# Patient Record
Sex: FEMALE | Race: WHITE | NOT HISPANIC OR LATINO | URBAN - METROPOLITAN AREA
[De-identification: names, ages, dates, MRNs, and addresses within clinical notes are randomized per-mention and may not be internally consistent; named-entity substitution may affect disease eponyms.]

---

## 2018-01-12 ENCOUNTER — ON CAMPUS - OUTPATIENT (AMBULATORY)
Dept: URBAN - METROPOLITAN AREA HOSPITAL 2 | Facility: HOSPITAL | Age: 83
End: 2018-01-12

## 2018-01-12 VITALS
OXYGEN SATURATION: 97 % | HEART RATE: 66 BPM | HEIGHT: 64 IN | SYSTOLIC BLOOD PRESSURE: 135 MMHG | DIASTOLIC BLOOD PRESSURE: 78 MMHG | SYSTOLIC BLOOD PRESSURE: 162 MMHG | DIASTOLIC BLOOD PRESSURE: 85 MMHG | RESPIRATION RATE: 18 BRPM | HEART RATE: 72 BPM | HEART RATE: 63 BPM | DIASTOLIC BLOOD PRESSURE: 58 MMHG | DIASTOLIC BLOOD PRESSURE: 72 MMHG | DIASTOLIC BLOOD PRESSURE: 77 MMHG | TEMPERATURE: 97.6 F | HEART RATE: 67 BPM | SYSTOLIC BLOOD PRESSURE: 179 MMHG | HEART RATE: 68 BPM | WEIGHT: 140 LBS | HEART RATE: 71 BPM | RESPIRATION RATE: 15 BRPM | RESPIRATION RATE: 16 BRPM | HEART RATE: 61 BPM | SYSTOLIC BLOOD PRESSURE: 169 MMHG | SYSTOLIC BLOOD PRESSURE: 153 MMHG | DIASTOLIC BLOOD PRESSURE: 81 MMHG | SYSTOLIC BLOOD PRESSURE: 148 MMHG | SYSTOLIC BLOOD PRESSURE: 161 MMHG | OXYGEN SATURATION: 96 % | OXYGEN SATURATION: 100 % | DIASTOLIC BLOOD PRESSURE: 66 MMHG | DIASTOLIC BLOOD PRESSURE: 65 MMHG

## 2018-01-12 DIAGNOSIS — K57.30 DIVERTICULOSIS OF LARGE INTESTINE WITHOUT PERFORATION OR ABS: ICD-10-CM

## 2018-01-12 DIAGNOSIS — R19.5 OTHER FECAL ABNORMALITIES: ICD-10-CM

## 2018-01-12 DIAGNOSIS — K64.1 SECOND DEGREE HEMORRHOIDS: ICD-10-CM

## 2018-01-12 PROCEDURE — 45378 DIAGNOSTIC COLONOSCOPY: CPT | Performed by: INTERNAL MEDICINE

## 2018-01-12 RX ADMIN — PROPOFOL: 10 INJECTION, EMULSION INTRAVENOUS at 13:38

## 2023-09-07 ENCOUNTER — HOSPITAL ENCOUNTER (OUTPATIENT)
Facility: HOSPITAL | Age: 88
Setting detail: OBSERVATION
Discharge: HOME | End: 2023-09-08
Attending: INTERNAL MEDICINE
Payer: MEDICARE

## 2023-09-07 ENCOUNTER — APPOINTMENT (OUTPATIENT)
Dept: GENERAL RADIOLOGY | Facility: HOSPITAL | Age: 88
End: 2023-09-07
Payer: MEDICARE

## 2023-09-07 ENCOUNTER — APPOINTMENT (OUTPATIENT)
Dept: CT IMAGING | Facility: HOSPITAL | Age: 88
End: 2023-09-07
Payer: MEDICARE

## 2023-09-07 ENCOUNTER — APPOINTMENT (OUTPATIENT)
Dept: MRI IMAGING | Facility: HOSPITAL | Age: 88
End: 2023-09-07
Payer: MEDICARE

## 2023-09-07 DIAGNOSIS — I48.91 ATRIAL FIBRILLATION WITH RVR: Primary | ICD-10-CM

## 2023-09-07 DIAGNOSIS — U07.1 COVID-19 VIRUS DETECTED: ICD-10-CM

## 2023-09-07 DIAGNOSIS — R20.0 NUMBNESS: ICD-10-CM

## 2023-09-07 DIAGNOSIS — R55 NEAR SYNCOPE: ICD-10-CM

## 2023-09-07 LAB
ALBUMIN SERPL-MCNC: 3.6 G/DL (ref 3.5–5.2)
ALBUMIN/GLOB SERPL: 1.2 G/DL
ALP SERPL-CCNC: 68 U/L (ref 39–117)
ALT SERPL W P-5'-P-CCNC: 11 U/L (ref 1–33)
ANION GAP SERPL CALCULATED.3IONS-SCNC: 11 MMOL/L (ref 5–15)
AST SERPL-CCNC: 20 U/L (ref 1–32)
B PARAPERT DNA SPEC QL NAA+PROBE: NOT DETECTED
B PERT DNA SPEC QL NAA+PROBE: NOT DETECTED
BACTERIA UR QL AUTO: ABNORMAL /HPF
BASOPHILS # BLD AUTO: 0 10*3/MM3 (ref 0–0.2)
BASOPHILS NFR BLD AUTO: 0.3 % (ref 0–1.5)
BILIRUB SERPL-MCNC: 0.6 MG/DL (ref 0–1.2)
BILIRUB UR QL STRIP: NEGATIVE
BUN SERPL-MCNC: 19 MG/DL (ref 8–23)
BUN/CREAT SERPL: 24.1 (ref 7–25)
C PNEUM DNA NPH QL NAA+NON-PROBE: NOT DETECTED
CALCIUM SPEC-SCNC: 9.9 MG/DL (ref 8.6–10.5)
CHLORIDE SERPL-SCNC: 96 MMOL/L (ref 98–107)
CLARITY UR: CLEAR
CO2 SERPL-SCNC: 26 MMOL/L (ref 22–29)
COLOR UR: YELLOW
CREAT SERPL-MCNC: 0.79 MG/DL (ref 0.57–1)
DEPRECATED RDW RBC AUTO: 45.1 FL (ref 37–54)
EGFRCR SERPLBLD CKD-EPI 2021: 72.1 ML/MIN/1.73
EOSINOPHIL # BLD AUTO: 0 10*3/MM3 (ref 0–0.4)
EOSINOPHIL NFR BLD AUTO: 0.2 % (ref 0.3–6.2)
ERYTHROCYTE [DISTWIDTH] IN BLOOD BY AUTOMATED COUNT: 14 % (ref 12.3–15.4)
FLUAV SUBTYP SPEC NAA+PROBE: NOT DETECTED
FLUBV RNA ISLT QL NAA+PROBE: NOT DETECTED
GEN 5 2HR TROPONIN T REFLEX: 23 NG/L
GLOBULIN UR ELPH-MCNC: 2.9 GM/DL
GLUCOSE SERPL-MCNC: 99 MG/DL (ref 65–99)
GLUCOSE UR STRIP-MCNC: NEGATIVE MG/DL
HADV DNA SPEC NAA+PROBE: NOT DETECTED
HCOV 229E RNA SPEC QL NAA+PROBE: NOT DETECTED
HCOV HKU1 RNA SPEC QL NAA+PROBE: NOT DETECTED
HCOV NL63 RNA SPEC QL NAA+PROBE: NOT DETECTED
HCOV OC43 RNA SPEC QL NAA+PROBE: NOT DETECTED
HCT VFR BLD AUTO: 36.5 % (ref 34–46.6)
HGB BLD-MCNC: 11.9 G/DL (ref 12–15.9)
HGB UR QL STRIP.AUTO: ABNORMAL
HMPV RNA NPH QL NAA+NON-PROBE: NOT DETECTED
HPIV1 RNA ISLT QL NAA+PROBE: NOT DETECTED
HPIV2 RNA SPEC QL NAA+PROBE: NOT DETECTED
HPIV3 RNA NPH QL NAA+PROBE: NOT DETECTED
HPIV4 P GENE NPH QL NAA+PROBE: NOT DETECTED
HYALINE CASTS UR QL AUTO: ABNORMAL /LPF
KETONES UR QL STRIP: ABNORMAL
LEUKOCYTE ESTERASE UR QL STRIP.AUTO: ABNORMAL
LYMPHOCYTES # BLD AUTO: 1 10*3/MM3 (ref 0.7–3.1)
LYMPHOCYTES NFR BLD AUTO: 16.9 % (ref 19.6–45.3)
M PNEUMO IGG SER IA-ACNC: NOT DETECTED
MAGNESIUM SERPL-MCNC: 2.1 MG/DL (ref 1.6–2.4)
MCH RBC QN AUTO: 28.7 PG (ref 26.6–33)
MCHC RBC AUTO-ENTMCNC: 32.7 G/DL (ref 31.5–35.7)
MCV RBC AUTO: 87.6 FL (ref 79–97)
MONOCYTES # BLD AUTO: 0.8 10*3/MM3 (ref 0.1–0.9)
MONOCYTES NFR BLD AUTO: 13.7 % (ref 5–12)
NEUTROPHILS NFR BLD AUTO: 4.1 10*3/MM3 (ref 1.7–7)
NEUTROPHILS NFR BLD AUTO: 68.9 % (ref 42.7–76)
NITRITE UR QL STRIP: NEGATIVE
NRBC BLD AUTO-RTO: 0.1 /100 WBC (ref 0–0.2)
PH UR STRIP.AUTO: 6 [PH] (ref 5–8)
PLATELET # BLD AUTO: 124 10*3/MM3 (ref 140–450)
PMV BLD AUTO: 8.8 FL (ref 6–12)
POTASSIUM SERPL-SCNC: 4 MMOL/L (ref 3.5–5.2)
PROT SERPL-MCNC: 6.5 G/DL (ref 6–8.5)
PROT UR QL STRIP: NEGATIVE
QT INTERVAL: 332 MS
QTC INTERVAL: 367 MS
RBC # BLD AUTO: 4.16 10*6/MM3 (ref 3.77–5.28)
RBC # UR STRIP: ABNORMAL /HPF
REF LAB TEST METHOD: ABNORMAL
RHINOVIRUS RNA SPEC NAA+PROBE: NOT DETECTED
RSV RNA NPH QL NAA+NON-PROBE: NOT DETECTED
SARS-COV-2 RNA NPH QL NAA+NON-PROBE: DETECTED
SODIUM SERPL-SCNC: 133 MMOL/L (ref 136–145)
SP GR UR STRIP: 1.01 (ref 1–1.03)
SQUAMOUS #/AREA URNS HPF: ABNORMAL /HPF
TROPONIN T DELTA: 0 NG/L
TROPONIN T SERPL HS-MCNC: 23 NG/L
TSH SERPL DL<=0.05 MIU/L-ACNC: 3.87 UIU/ML (ref 0.27–4.2)
UROBILINOGEN UR QL STRIP: ABNORMAL
WBC # UR STRIP: ABNORMAL /HPF
WBC NRBC COR # BLD: 6 10*3/MM3 (ref 3.4–10.8)

## 2023-09-07 PROCEDURE — 71045 X-RAY EXAM CHEST 1 VIEW: CPT

## 2023-09-07 PROCEDURE — 25010000002 ONDANSETRON PER 1 MG: Performed by: NURSE PRACTITIONER

## 2023-09-07 PROCEDURE — G0378 HOSPITAL OBSERVATION PER HR: HCPCS

## 2023-09-07 PROCEDURE — 96365 THER/PROPH/DIAG IV INF INIT: CPT

## 2023-09-07 PROCEDURE — 99204 OFFICE O/P NEW MOD 45 MIN: CPT | Performed by: NURSE PRACTITIONER

## 2023-09-07 PROCEDURE — 83735 ASSAY OF MAGNESIUM: CPT | Performed by: PHYSICIAN ASSISTANT

## 2023-09-07 PROCEDURE — 0202U NFCT DS 22 TRGT SARS-COV-2: CPT | Performed by: PHYSICIAN ASSISTANT

## 2023-09-07 PROCEDURE — 85025 COMPLETE CBC W/AUTO DIFF WBC: CPT | Performed by: PHYSICIAN ASSISTANT

## 2023-09-07 PROCEDURE — 70551 MRI BRAIN STEM W/O DYE: CPT

## 2023-09-07 PROCEDURE — 81001 URINALYSIS AUTO W/SCOPE: CPT | Performed by: PHYSICIAN ASSISTANT

## 2023-09-07 PROCEDURE — 70450 CT HEAD/BRAIN W/O DYE: CPT

## 2023-09-07 PROCEDURE — 93005 ELECTROCARDIOGRAM TRACING: CPT | Performed by: INTERNAL MEDICINE

## 2023-09-07 PROCEDURE — 99284 EMERGENCY DEPT VISIT MOD MDM: CPT

## 2023-09-07 PROCEDURE — 84484 ASSAY OF TROPONIN QUANT: CPT | Performed by: PHYSICIAN ASSISTANT

## 2023-09-07 PROCEDURE — 25010000002 LORAZEPAM PER 2 MG: Performed by: INTERNAL MEDICINE

## 2023-09-07 PROCEDURE — 93005 ELECTROCARDIOGRAM TRACING: CPT | Performed by: PHYSICIAN ASSISTANT

## 2023-09-07 PROCEDURE — 96375 TX/PRO/DX INJ NEW DRUG ADDON: CPT

## 2023-09-07 PROCEDURE — 84443 ASSAY THYROID STIM HORMONE: CPT | Performed by: PHYSICIAN ASSISTANT

## 2023-09-07 PROCEDURE — 80053 COMPREHEN METABOLIC PANEL: CPT | Performed by: PHYSICIAN ASSISTANT

## 2023-09-07 PROCEDURE — 96366 THER/PROPH/DIAG IV INF ADDON: CPT

## 2023-09-07 RX ORDER — LOSARTAN POTASSIUM AND HYDROCHLOROTHIAZIDE 25; 100 MG/1; MG/1
1 TABLET ORAL DAILY
COMMUNITY
End: 2023-09-08 | Stop reason: HOSPADM

## 2023-09-07 RX ORDER — NITROGLYCERIN 0.4 MG/1
0.4 TABLET SUBLINGUAL
Status: DISCONTINUED | OUTPATIENT
Start: 2023-09-07 | End: 2023-09-08 | Stop reason: HOSPADM

## 2023-09-07 RX ORDER — MULTIPLE VITAMINS W/ MINERALS TAB 9MG-400MCG
1 TAB ORAL NIGHTLY
COMMUNITY

## 2023-09-07 RX ORDER — MULTIVIT WITH MINERALS/LUTEIN
500 TABLET ORAL DAILY
COMMUNITY

## 2023-09-07 RX ORDER — DILTIAZEM HCL/D5W 125 MG/125
5-15 PLASTIC BAG, INJECTION (ML) INTRAVENOUS CONTINUOUS
Status: DISCONTINUED | OUTPATIENT
Start: 2023-09-07 | End: 2023-09-08 | Stop reason: HOSPADM

## 2023-09-07 RX ORDER — ACETAMINOPHEN 650 MG/1
650 SUPPOSITORY RECTAL EVERY 4 HOURS PRN
Status: DISCONTINUED | OUTPATIENT
Start: 2023-09-07 | End: 2023-09-08 | Stop reason: HOSPADM

## 2023-09-07 RX ORDER — SODIUM CHLORIDE 0.9 % (FLUSH) 0.9 %
10 SYRINGE (ML) INJECTION AS NEEDED
Status: DISCONTINUED | OUTPATIENT
Start: 2023-09-07 | End: 2023-09-08 | Stop reason: HOSPADM

## 2023-09-07 RX ORDER — ACETAMINOPHEN 325 MG/1
650 TABLET ORAL EVERY 4 HOURS PRN
Status: DISCONTINUED | OUTPATIENT
Start: 2023-09-07 | End: 2023-09-08 | Stop reason: HOSPADM

## 2023-09-07 RX ORDER — ACETAMINOPHEN 160 MG/5ML
650 SOLUTION ORAL EVERY 4 HOURS PRN
Status: DISCONTINUED | OUTPATIENT
Start: 2023-09-07 | End: 2023-09-08 | Stop reason: HOSPADM

## 2023-09-07 RX ORDER — BISACODYL 5 MG/1
5 TABLET, DELAYED RELEASE ORAL DAILY PRN
Status: DISCONTINUED | OUTPATIENT
Start: 2023-09-07 | End: 2023-09-08 | Stop reason: HOSPADM

## 2023-09-07 RX ORDER — AMOXICILLIN 250 MG
2 CAPSULE ORAL 2 TIMES DAILY
Status: DISCONTINUED | OUTPATIENT
Start: 2023-09-07 | End: 2023-09-08 | Stop reason: HOSPADM

## 2023-09-07 RX ORDER — METOPROLOL SUCCINATE 100 MG/1
100 TABLET, EXTENDED RELEASE ORAL DAILY
COMMUNITY
End: 2023-09-08 | Stop reason: HOSPADM

## 2023-09-07 RX ORDER — SODIUM CHLORIDE 9 MG/ML
40 INJECTION, SOLUTION INTRAVENOUS AS NEEDED
Status: DISCONTINUED | OUTPATIENT
Start: 2023-09-07 | End: 2023-09-08 | Stop reason: HOSPADM

## 2023-09-07 RX ORDER — LORAZEPAM 2 MG/ML
1 INJECTION INTRAMUSCULAR ONCE
Status: COMPLETED | OUTPATIENT
Start: 2023-09-07 | End: 2023-09-07

## 2023-09-07 RX ORDER — MELATONIN
1000 NIGHTLY
COMMUNITY

## 2023-09-07 RX ORDER — ONDANSETRON 2 MG/ML
4 INJECTION INTRAMUSCULAR; INTRAVENOUS EVERY 6 HOURS PRN
Status: DISCONTINUED | OUTPATIENT
Start: 2023-09-07 | End: 2023-09-08 | Stop reason: HOSPADM

## 2023-09-07 RX ORDER — FAMOTIDINE 20 MG/1
40 TABLET, FILM COATED ORAL DAILY
Status: DISCONTINUED | OUTPATIENT
Start: 2023-09-07 | End: 2023-09-08

## 2023-09-07 RX ORDER — SODIUM CHLORIDE 0.9 % (FLUSH) 0.9 %
10 SYRINGE (ML) INJECTION EVERY 12 HOURS SCHEDULED
Status: DISCONTINUED | OUTPATIENT
Start: 2023-09-07 | End: 2023-09-08 | Stop reason: HOSPADM

## 2023-09-07 RX ORDER — VITAMIN E 268 MG
400 CAPSULE ORAL DAILY
COMMUNITY

## 2023-09-07 RX ORDER — ONDANSETRON 4 MG/1
4 TABLET, FILM COATED ORAL EVERY 6 HOURS PRN
Status: DISCONTINUED | OUTPATIENT
Start: 2023-09-07 | End: 2023-09-08 | Stop reason: HOSPADM

## 2023-09-07 RX ORDER — MULTIVITAMIN WITH IRON
250 TABLET ORAL
COMMUNITY

## 2023-09-07 RX ORDER — BISACODYL 10 MG
10 SUPPOSITORY, RECTAL RECTAL DAILY PRN
Status: DISCONTINUED | OUTPATIENT
Start: 2023-09-07 | End: 2023-09-08 | Stop reason: HOSPADM

## 2023-09-07 RX ORDER — CHOLECALCIFEROL (VITAMIN D3) 125 MCG
1000 CAPSULE ORAL DAILY
COMMUNITY

## 2023-09-07 RX ORDER — ASPIRIN 81 MG/1
81 TABLET ORAL NIGHTLY
Status: DISCONTINUED | OUTPATIENT
Start: 2023-09-07 | End: 2023-09-08 | Stop reason: HOSPADM

## 2023-09-07 RX ORDER — ASPIRIN 81 MG/1
81 TABLET ORAL NIGHTLY
COMMUNITY

## 2023-09-07 RX ORDER — POLYETHYLENE GLYCOL 3350 17 G/17G
17 POWDER, FOR SOLUTION ORAL DAILY PRN
Status: DISCONTINUED | OUTPATIENT
Start: 2023-09-07 | End: 2023-09-08 | Stop reason: HOSPADM

## 2023-09-07 RX ADMIN — Medication 10 ML: at 21:47

## 2023-09-07 RX ADMIN — RIVAROXABAN 15 MG: 15 TABLET, FILM COATED ORAL at 17:04

## 2023-09-07 RX ADMIN — LORAZEPAM 1 MG: 2 INJECTION INTRAMUSCULAR; INTRAVENOUS at 17:04

## 2023-09-07 RX ADMIN — ASPIRIN 81 MG: 81 TABLET, COATED ORAL at 21:46

## 2023-09-07 RX ADMIN — Medication 5 MG/HR: at 12:15

## 2023-09-07 RX ADMIN — FAMOTIDINE 40 MG: 20 TABLET ORAL at 17:04

## 2023-09-07 RX ADMIN — ONDANSETRON 4 MG: 2 INJECTION INTRAMUSCULAR; INTRAVENOUS at 21:30

## 2023-09-07 RX ADMIN — SENNOSIDES AND DOCUSATE SODIUM 2 TABLET: 50; 8.6 TABLET ORAL at 21:46

## 2023-09-07 NOTE — ED NOTES
Spoke to Dr. María Elena MD aware pt is in sinus rhythm. Pt to stay on Cardizem drip, unless pt becomes hypotensive.

## 2023-09-07 NOTE — H&P
[facility] Medicine Services  History & Physical    Patient Name: Sydnee Strange  : 1935  MRN: [id number]  Primary Care Physician:  Provider, No Known  Date of admission: 2023  Date and Time of Service: 2023 at 1500 p.m.    Subjective      Chief Complaint: Near syncope    History of Present Illness: Sydnee Strange is a 88 y.o. female with chronic atrial fibrillation, hypertension, hyperlipidemia, recent mechanical fall who presented to [facility] on 2023 complaining of cough and shortness of breath for 3 to 4 days, and dizziness and lip numbness that started this morning lasting approximately 30 minutes.  Patient was found to be in atrial fibrillation with rates in the 120s, and COVID-positive.  Patient currently able to carry on conversation without dyspnea on room air and is now in normal sinus rhythm after initiation of Cardizem drip in the emergency department.    CT of the head demonstrated scalp hematoma but no acute intracranial process, chest x-ray without acute changes, troponin 23x2      Review of Systems   Constitutional: Negative for diaphoresis, malaise/fatigue, weight gain and weight loss.   Cardiovascular:  Positive for near-syncope. Negative for chest pain, dyspnea on exertion, leg swelling, orthopnea, palpitations and syncope.   Respiratory:  Positive for cough and shortness of breath. Negative for hemoptysis, sputum production and wheezing.    Skin:  Negative for rash.   Gastrointestinal:  Negative for abdominal pain, hematemesis, hematochezia, nausea and vomiting.   Neurological:  Positive for numbness. Negative for dizziness, light-headedness and seizures.   Psychiatric/Behavioral: Negative.     All other systems reviewed and are negative.     Personal History     Past Medical History:   Diagnosis Date    Atrial fibrillation     Hyperlipidemia     Hypertension        Past Surgical History:   Procedure Laterality Date    BACK SURGERY         Family  History: family history is not on file. Otherwise pertinent FHx was reviewed and not pertinent to current issue.    Social History:  reports that she has never smoked. She has never used smokeless tobacco. She reports that she does not drink alcohol and does not use drugs.    Home Medications:  Prior to Admission Medications       Prescriptions Last Dose Informant Patient Reported? Taking?    aspirin 81 MG EC tablet   Yes Yes    Take 1 tablet by mouth Every Night.    cholecalciferol (VITAMIN D3) 25 MCG (1000 UT) tablet   Yes Yes    Take 1 tablet by mouth Every Night.    Flaxseed, Linseed, (FLAXSEED OIL PO)   Yes Yes    Take 1 capsule by mouth Daily.    losartan-hydrochlorothiazide (HYZAAR) 100-25 MG per tablet   Yes Yes    Take 1 tablet by mouth Daily.    Magnesium 250 MG tablet   Yes Yes    Take 1 tablet by mouth.    metoprolol succinate XL (TOPROL-XL) 100 MG 24 hr tablet   Yes Yes    Take 1 tablet by mouth Daily.    multivitamin with minerals tablet tablet   Yes Yes    Take 1 tablet by mouth Every Night.    rivaroxaban (XARELTO) 15 MG tablet   Yes Yes    Take 1 tablet by mouth Daily.    vitamin B-12 (CYANOCOBALAMIN) 500 MCG tablet   Yes Yes    Take 2 tablets by mouth Daily.    vitamin C (ASCORBIC ACID) 250 MG tablet   Yes Yes    Take 2 tablets by mouth Daily.    vitamin E 400 UNIT capsule   Yes Yes    Take 1 capsule by mouth Daily.              Allergies:  Allergies   Allergen Reactions    Shellfish-Derived Products Anaphylaxis    Erythromycin Unknown - High Severity    Keflex [Cephalexin] Unknown - High Severity    Nitrofuran Derivatives Unknown - High Severity    Penicillins Unknown - High Severity    Prednisone Unknown - High Severity       Objective      Vitals:   Temp:  [97.8 °F (36.6 °C)] 97.8 °F (36.6 °C)  Heart Rate:  [] 83  Resp:  [18] 18  BP: (109-122)/(47-71) 109/51    Physical Exam       Result Review    Result Review:  I have personally reviewed the results from the time of this admission to  9/7/2023 14:20 EDT and agree with these findings:  [x]  Laboratory  []  Microbiology  [x]  Radiology  [x]  EKG/Telemetry   []  Cardiology/Vascular   []  Pathology  []  Old records  []  Other:  Most notable findings include: See summary and plan (See history of present illness and plan)      Assessment & Plan        Active Hospital Problems:  There are no active hospital problems to display for this patient.    Plan:   #Near syncope, lip numbness  #Previous mechanical fall resulting in left eye bruising  -Troponin flat at 23  -Telemetry monitoring  -CT head without bleeding  -Check echo  -Check MRI brain  -Orthostatics without significant change  -Consult PT    #A-fib RVR  #Chronic anticoagulation with Xarelto  -Follows with Dr. Garcia on outpatient basis  -TSH normal  -Consult cardiology  -Continue IV Cardizem for now  -Hold home metoprolol  -Resume Xarelto    #COVID-positive  -Short of breath with orthostasis this morning  -No remdesivir as patient is stable on room air    #Hypertension  -Hold losartan/HCTZ  -Vitals per unit protocol    #Hyperlipidemia  -No statin on home medication list    DVT prophylaxis:  Medical DVT prophylaxis orders are present.    CODE STATUS:       Admission Status:  I believe this patient meets observation status.    I discussed the patient's findings and my recommendations with patient and family.    This patient has been examined wearing the appropriate protective equipment  Signature: Electronically signed by GELA Sahni, 09/07/23, 14:20 EDT.  [facility] Hospitalist Team

## 2023-09-07 NOTE — ED PROVIDER NOTES
Subjective   History of Present Illness  Patient is an 88-year-old female PMH significant for multiple complaints including lightheadedness/near syncopal, fatigue, general not feeling well that started today.  She also reports has had a cough over the past week worsening over the past 3 days with low-grade temp. she denies any significant chest pain or shortness of breath.  She also denies any headache or visual disturbances.  No reports of abdominal pain, nausea, vomiting, or urinary complaints.  Does report her lightheadedness is worse with ambulation and also reports that she had right lower lip numbness that lasted for about 30 minutes this morning starting around 7 AM.  She currently denies any numbness.  At the time she denies any associated weakness, slurred speech, facial droop, visual disturbances.  Does report history of A-fib denies any palpitations.  She did have bruising noted on her face she does report a prior fall that she was seen at [facility] for    History provided by:  Patient and relative    Review of Systems   Constitutional:  Positive for fatigue and fever. Negative for activity change, appetite change, chills, diaphoresis and unexpected weight change.   HENT:  Negative for congestion, ear discharge, ear pain, facial swelling, sinus pressure, sinus pain, sore throat, trouble swallowing and voice change.    Eyes: Negative.    Respiratory:  Positive for cough and wheezing. Negative for apnea, choking, chest tightness, shortness of breath and stridor.    Cardiovascular: Negative.    Gastrointestinal:  Negative for abdominal distention, abdominal pain, nausea and vomiting.   Genitourinary:  Negative for difficulty urinating, dysuria, flank pain and frequency.   Musculoskeletal:  Negative for back pain and neck stiffness.   Skin: Negative.    Neurological:  Positive for weakness, light-headedness and numbness. Negative for dizziness, seizures, syncope and headaches.     Past Medical  History:   Diagnosis Date    Atrial fibrillation     Hyperlipidemia     Hypertension        Allergies   Allergen Reactions    Shellfish-Derived Products Anaphylaxis    Erythromycin Unknown - High Severity    Keflex [Cephalexin] Unknown - High Severity    Nitrofuran Derivatives Unknown - High Severity    Penicillins Unknown - High Severity    Prednisone Unknown - High Severity       Past Surgical History:   Procedure Laterality Date    BACK SURGERY         History reviewed. No pertinent family history.    Social History     Socioeconomic History    Marital status:    Tobacco Use    Smoking status: Never    Smokeless tobacco: Never   Vaping Use    Vaping Use: Never used   Substance and Sexual Activity    Alcohol use: Never    Drug use: Never    Sexual activity: Defer           Objective   Physical Exam  Vitals and nursing note reviewed.   Constitutional:       General: She is not in acute distress.     Appearance: Normal appearance. She is well-developed. She is not ill-appearing, toxic-appearing or diaphoretic.   HENT:      Head: Normocephalic. Contusion present.      Comments: Significant ecchymosis noted periorbitally worse on the left than the right and along the left side of her forehead from prior fall     Mouth/Throat:      Mouth: Mucous membranes are moist.      Pharynx: Oropharynx is clear.   Eyes:      General: No scleral icterus.     Extraocular Movements: Extraocular movements intact.      Pupils: Pupils are equal, round, and reactive to light.   Cardiovascular:      Rate and Rhythm: Regular rhythm. Tachycardia present.      Pulses: Normal pulses.      Heart sounds: Murmur heard.     No friction rub. No gallop.   Pulmonary:      Effort: Pulmonary effort is normal. No respiratory distress.      Breath sounds: Normal breath sounds. No stridor. No wheezing, rhonchi or rales.   Chest:      Chest wall: No tenderness.   Abdominal:      General: Bowel sounds are normal. There is no distension. There are no  "signs of injury.      Palpations: Abdomen is soft.      Tenderness: There is no abdominal tenderness. There is no guarding or rebound.   Skin:     General: Skin is warm.      Capillary Refill: Capillary refill takes less than 2 seconds.      Coloration: Skin is not cyanotic, jaundiced or pale.      Findings: No rash.   Neurological:      General: No focal deficit present.      Mental Status: She is alert and oriented to person, place, and time.      GCS: GCS eye subscore is 4. GCS verbal subscore is 5. GCS motor subscore is 6.      Comments: Normal and equal sensation strength throughout moving all extremities freely   Psychiatric:         Mood and Affect: Mood normal.         Behavior: Behavior normal.       Procedures           ED Course  ED Course as of 09/07/23 1345   Thu Sep 07, 2023   1301 COVID19(!!): Detected [AA]      ED Course User Index  [AA] Elisabet Ramos PA    /62 (BP Location: Right arm, Patient Position: Lying)   Pulse 69   Temp 97.8 °F (36.6 °C) (Oral)   Resp 18   Ht 160 cm (63\")   Wt 62.6 kg (138 lb)   LMP  (LMP Unknown)   SpO2 90%   BMI 24.45 kg/m²   Medications   sodium chloride 0.9 % flush 10 mL (has no administration in time range)   dilTIAZem (CARDIZEM) 125 mg in 125 mL D5W infusion (5 mg/hr Intravenous New Bag 9/7/23 1215)   dilTIAZem (CARDIZEM) bolus from bag 1 mg/mL 10 mg (10 mg Intravenous Bolus from Bag 9/7/23 1217)     Labs Reviewed   RESPIRATORY PANEL PCR W/ COVID-19 (SARS-COV-2) [facility] IN-HOUSE, NP SWAB [facility] [place], 3-4 HR TAT - Abnormal; Notable for the following components:       Result Value    COVID19 Detected (*)     All other components within normal limits    Narrative:     In the setting of a positive respiratory panel with a viral infection PLUS a negative procalcitonin without other underlying concern for bacterial infection, consider observing off antibiotics or discontinuation of antibiotics and continue supportive care. If the " respiratory panel is positive for atypical bacterial infection (Bordetella pertussis, Chlamydophila pneumoniae, or Mycoplasma pneumoniae), consider antibiotic de-escalation to target atypical bacterial infection.   COMPREHENSIVE METABOLIC PANEL - Abnormal; Notable for the following components:    Sodium 133 (*)     Chloride 96 (*)     All other components within normal limits    Narrative:     GFR Normal >60  Chronic Kidney Disease <60  Kidney Failure <15    The GFR formula is only valid for adults with stable renal function between ages 18 and 70.   TROPONIN - Abnormal; Notable for the following components:    HS Troponin T 23 (*)     All other components within normal limits    Narrative:     High Sensitive Troponin T Reference Range:  <10.0 ng/L- Negative Female for AMI  <15.0 ng/L- Negative Male for AMI  >=10 - Abnormal Female indicating possible myocardial injury.  >=15 - Abnormal Male indicating possible myocardial injury.   Clinicians would have to utilize clinical acumen, EKG, Troponin, and serial changes to determine if it is an Acute Myocardial Infarction or myocardial injury due to an underlying chronic condition.        CBC WITH AUTO DIFFERENTIAL - Abnormal; Notable for the following components:    Hemoglobin 11.9 (*)     Platelets 124 (*)     Lymphocyte % 16.9 (*)     Monocyte % 13.7 (*)     Eosinophil % 0.2 (*)     All other components within normal limits   TSH - Normal   MAGNESIUM - Normal   URINALYSIS W/ MICROSCOPIC IF INDICATED (NO CULTURE)   HIGH SENSITIVITIY TROPONIN T 2HR   CBC AND DIFFERENTIAL    Narrative:     The following orders were created for panel order CBC & Differential.  Procedure                               Abnormality         Status                     ---------                               -----------         ------                     CBC Auto Differential[[id number]]        Abnormal            Final result                 Please view results for these tests on the individual  orders.     CT Head Without Contrast   Final Result   Impression:      1. Small left frontal scalp hematoma.   2. No acute intracranial findings.   3. Mild atrophy and chronic microvascular disease.         Electronically Signed: Luz Doherty MD     9/7/2023 12:41 PM EDT     Workstation ID: [id number]      XR Chest 1 View   Final Result   Impression:   1.No acute radiographic abnormality is identified.   2.Probable pulmonary emphysema, evidence of prior granulomatous disease.         Electronically Signed: Faustino Turner MD     9/7/2023 11:58 AM EDT     Workstation ID: [id number]                                               Medical Decision Making  Chart Review: [facility] ED note reviewed from 9/2/2023 presented after a fall had a CT head which showed no acute intracranial abnormality she did have a laceration on her forehead repaired wall in the ED    Comorbidity: As per past medical history  Differentials: A-fib with RVR, arrhythmia otherwise, electrolyte abnormality, dehydration, pneumonia, stroke      ;this list is not all inclusive and does not constitute the entirety of considered causes  EKG: Interpreted by myself and Dr. Garcia shows atrial fibrillation rate 124 repolarization normality probably rate related no previous to review however patient does report history of atrial fibrillation  Labs: As above  Radiology: My interpretation CT head shows no obvious brain bleed, chest x-ray shows no pneumothorax correlated with radiologist interpretations as below  CT Head Without Contrast   Final Result    Impression:    1. Small left frontal scalp hematoma.    2. No acute intracranial findings.    3. Mild atrophy and chronic microvascular disease.        Electronically Signed: Luz Doherty MD      9/7/2023 12:41 PM EDT      Workstation ID: [id number]     XR Chest 1 View   Final Result    Impression:    1.No acute radiographic abnormality is identified.    2.Probable pulmonary emphysema, evidence of prior  granulomatous disease.       Electronically Signed: Faustino Turner MD      9/7/2023 11:58 AM EDT      Workstation ID: [id number]     Disposition/Treatment:  Appropriate PPE was worn during exam and throughout all encounters with the patient.  When the ED IV was placed and labs were obtained patient was placed on proper monitors she was afebrile appeared nontoxic and was not hypoxic but was found to be tachycardic on the monitor during my exam she would go anywhere from the 130s to 110s EKG was promptly obtained which showed A-fib with a rate of 124 patient was started on Cardizem with improvement heart rate now in the lower 100s.  Chest x-ray was obtained which showed no acute infiltrates additional findings as above.  CT head was also ordered due to her reports of numbness earlier today that has since subsided while in the ED which showed known scalp hematoma otherwise no acute intracranial abnormality.     Labs were obtained respiratory panel positive for COVID-19 patient reports cough has been going on for the past week but worsening over the past 3 days.  Patient does report being on Xarelto and has not missed any doses she is not hypoxic denying any chest pain or shortness of breath.  Initial troponin 23 pending repeat troponin.  TSH and magnesium normal.  CBC showed no leukocytosis hemoglobin 11.9 hematocrit 36.5 platelets 124.  Metabolic panel showed normal kidney and liver function sodium 133 chloride 96.  Urinalysis pending upon admission    Patient will be admitted for further evaluation of her near syncopal episode, possible TIA work-up for her numbness, and treatment of her A-fib with RVR.  Patient likely out of the window for antivirals COVID-19 as her cough has been going on for a week.  Findings were discussed the patient and family at bedside who are in agreement plan of admission.  Spoke to Dr. Bergeron who agreed for admission with hospitalist group    Problems Addressed:  Atrial fibrillation with RVR:  complicated acute illness or injury  COVID-19 virus detected: complicated acute illness or injury  Near syncope: complicated acute illness or injury  Numbness: complicated acute illness or injury    Amount and/or Complexity of Data Reviewed  Labs: ordered. Decision-making details documented in ED Course.  Radiology: ordered.  ECG/medicine tests: ordered.    Risk  Prescription drug management.  Decision regarding hospitalization.        Final diagnoses:   Atrial fibrillation with RVR   Near syncope   COVID-19 virus detected   Numbness       ED Disposition  ED Disposition       ED Disposition   Decision to Admit    Condition   --    Comment   Level of Care: Med/Surg [1]   Admitting Physician: MARICARMEN VÁSQUEZ [[id number]]   Attending Physician: MARICARMEN VÁSQUEZ [[id number]]                 No follow-up provider specified.       Medication List      No changes were made to your prescriptions during this visit.            Elisabet Ramos PA  09/07/23 9946

## 2023-09-07 NOTE — CONSULTS
"            Cardiology Consult Note      REQUESTING PHYSICIAN    Alonso Bergeron MD    PATIENT IDENTIFICATION  Name: Sydnee Strange  Age: 88 y.o.  Sex: female  :  1935  MRN: [id number]             REASON FOR CONSULTATION:  88-year-old female with no known history of ischemic heart disease.  Past medical history includes atrial fibrillation, hypertension, elevated chads vascular score anticoagulated with Xarelto.  Primary cardiologist: Dr. Rafa Garcia      CC:  Shortness of breath  Cough  Dizziness    HISTORY OF PRESENT ILLNESS:   Patient presented to the emergency department at [facility] 2023 with symptoms as above for 3-4 days.  In the ED, EKG confirmed atrial fibrillation with ventricular rates in the 120s.  She was given IV Cardizem bolus and started on drip.  She has had a recent mechanical fall with scalp hematoma and head CT with no acute findings.  She has since converted to sinus rhythm.      REVIEW OF SYSTEMS:  Pertinent items are noted in HPI, all other systems reviewed and negative    OBJECTIVE   High-sensitivity troponin 23, 23  COVID-19 positive  EKG shows atrial fibrillation at 124 bpm.    ASSESSMENT  Atrial fibrillation with rapid ventricular response  History of paroxysmal atrial fibrillation  COVID-19 positive status  Recent mechanical fall  Primary hypertension  Dyslipidemia  Elevated chads vascular score    PLAN  Patient has converted to sinus rhythm.  We will continue low-dose diltiazem for now.  Continue aspirin and rivaroxaban  Limited echo ordered due to COVID-19 status and limiting exposure  Continue to monitor rhythm closely  Troponin flat, nontrending in the setting of RVR.  Most likely demand ischemia.  Hemodynamics are quite stable  Monitor and replete electrolytes as needed        Vital Signs  Visit Vitals  /52   Pulse 73   Temp 97.8 °F (36.6 °C) (Oral)   Resp 18   Ht 160 cm (63\")   Wt 62.6 kg (138 lb)   LMP  (LMP Unknown)   SpO2 100%   BMI 24.45 kg/m² " "    Oxygen Therapy  SpO2: 100 %  Pulse Oximetry Type: Continuous  Device (Oxygen Therapy): room air  Flowsheet Rows      Flowsheet Row First Filed Value   Admission Height 160 cm (63\") Documented at 09/07/2023 1040   Admission Weight 62.6 kg (138 lb) Documented at 09/07/2023 1040          Intake & Output (last 3 days)       None          Lines, Drains & Airways       Active LDAs       Name Placement date Placement time Site Days    Peripheral IV 09/07/23 1139 Left Antecubital 09/07/23  1139  Antecubital  less than 1                    MEDICAL HISTORY    Past Medical History:   Diagnosis Date    Atrial fibrillation     Hyperlipidemia     Hypertension         SURGICAL HISTORY    Past Surgical History:   Procedure Laterality Date    BACK SURGERY          FAMILY HISTORY    History reviewed. No pertinent family history.    SOCIAL HISTORY    Social History     Tobacco Use    Smoking status: Never    Smokeless tobacco: Never   Substance Use Topics    Alcohol use: Never        ALLERGIES    Allergies   Allergen Reactions    Shellfish-Derived Products Anaphylaxis    Erythromycin Unknown - High Severity    Keflex [Cephalexin] Unknown - High Severity    Nitrofuran Derivatives Unknown - High Severity    Penicillins Unknown - High Severity    Prednisone Unknown - High Severity              /52   Pulse 73   Temp 97.8 °F (36.6 °C) (Oral)   Resp 18   Ht 160 cm (63\")   Wt 62.6 kg (138 lb)   LMP  (LMP Unknown)   SpO2 100%   BMI 24.45 kg/m²   Intake/Output last 3 shifts:  No intake/output data recorded.  Intake/Output this shift:  No intake/output data recorded.    PHYSICAL EXAM:  Physical Exam  Vitals and nursing note reviewed.   Constitutional:       General: She is not in acute distress.     Appearance: She is not ill-appearing or toxic-appearing.   HENT:      Head:      Comments: Large healing bruise to left side of face from temporal area down to bottom of left cheek.     Mouth/Throat:      Mouth: Mucous membranes " are moist.   Eyes:      Extraocular Movements: Extraocular movements intact.      Pupils: Pupils are equal, round, and reactive to light.   Cardiovascular:      Rate and Rhythm: Normal rate and regular rhythm.      Heart sounds: Murmur heard.   Pulmonary:      Effort: Pulmonary effort is normal. No respiratory distress.      Breath sounds: No stridor. No wheezing or rhonchi.   Abdominal:      Tenderness: There is no abdominal tenderness.   Musculoskeletal:      Right lower leg: No edema.      Left lower leg: No edema.   Neurological:      Mental Status: She is alert.       Scheduled Meds:      aspirin, 81 mg, Oral, Nightly  famotidine, 40 mg, Oral, Daily  rivaroxaban, 15 mg, Oral, Daily  senna-docusate sodium, 2 tablet, Oral, BID  sodium chloride, 10 mL, Intravenous, Q12H        Continuous Infusions:    dilTIAZem, 5-15 mg/hr, Last Rate: 5 mg/hr (09/07/23 1422)        PRN Meds:      acetaminophen **OR** acetaminophen **OR** acetaminophen    senna-docusate sodium **AND** polyethylene glycol **AND** bisacodyl **AND** bisacodyl    nitroglycerin    ondansetron **OR** ondansetron    [COMPLETED] Insert Peripheral IV **AND** sodium chloride    sodium chloride    sodium chloride        Results Review:     I reviewed the patient's new clinical results.    CBC    Results from last 7 days   Lab Units 09/07/23  1140   WBC 10*3/mm3 6.00   HEMOGLOBIN g/dL 11.9*   PLATELETS 10*3/mm3 124*     Cr Clearance Estimated Creatinine Clearance: 48.6 mL/min (by C-G formula based on SCr of 0.79 mg/dL).  Coag     HbA1C No results found for: HGBA1C  Blood Glucose No results found for: POCGLU  Infection     CMP   Results from last 7 days   Lab Units 09/07/23  1140   SODIUM mmol/L 133*   POTASSIUM mmol/L 4.0   CHLORIDE mmol/L 96*   CO2 mmol/L 26.0   BUN mg/dL 19   CREATININE mg/dL 0.79   GLUCOSE mg/dL 99   ALBUMIN g/dL 3.6   BILIRUBIN mg/dL 0.6   ALK PHOS U/L 68   AST (SGOT) U/L 20   ALT (SGPT) U/L 11     ABG      UA    Results from last 7 days    Lab Units 09/07/23  1407   NITRITE UA  Negative   WBC UA /HPF 3-5*   BACTERIA UA /HPF None Seen   SQUAM EPITHEL UA /HPF 3-6*     MARTÍN  No results found for: POCMETH, POCAMPHET, POCBARBITUR, POCBENZO, POCCOCAINE, POCOPIATES, POCOXYCODO, POCPHENCYC, POCPROPOXY, POCTHC, POCTRICYC  Lysis Labs   Results from last 7 days   Lab Units 09/07/23  1140   HEMOGLOBIN g/dL 11.9*   PLATELETS 10*3/mm3 124*   CREATININE mg/dL 0.79     Radiology(recent) CT Head Without Contrast    Result Date: 9/7/2023  Impression: 1. Small left frontal scalp hematoma. 2. No acute intracranial findings. 3. Mild atrophy and chronic microvascular disease. Electronically Signed: Luz Doherty MD  9/7/2023 12:41 PM EDT  Workstation ID: [id number]    XR Chest 1 View    Result Date: 9/7/2023  Impression: 1.No acute radiographic abnormality is identified. 2.Probable pulmonary emphysema, evidence of prior granulomatous disease. Electronically Signed: Faustino Turner MD  9/7/2023 11:58 AM EDT  Workstation ID: [id number]       Results from last 7 days   Lab Units 09/07/23  1426   HSTROP T ng/L 23*       Xrays, labs reviewed personally by physician.    ECG/EMG Results (most recent)       Procedure Component Value Units Date/Time    ECG 12 Lead Other; near syncope [[id number]] Collected: 09/07/23 1134     Updated: 09/07/23 1135     QT Interval 293 ms      QTC Interval 421 ms     Narrative:      HEART RATE= 124  bpm  RR Interval= 485  ms  DE Interval=   ms  P Horizontal Axis=   deg  P Front Axis=   deg  QRSD Interval= 72  ms  QT Interval= 293  ms  QTcB= 421  ms  QRS Axis= 20  deg  T Wave Axis= 259  deg  - ABNORMAL ECG -  Atrial fibrillation  Repolarization abnormality, prob rate related  Electronically Signed By:   Date and Time of Study: 2023-09-07 11:34:03              Medication Review:   I have reviewed the patient's current medication list  Scheduled Meds:aspirin, 81 mg, Oral, Nightly  famotidine, 40 mg, Oral, Daily  rivaroxaban, 15 mg, Oral,  Daily  senna-docusate sodium, 2 tablet, Oral, BID  sodium chloride, 10 mL, Intravenous, Q12H      Continuous Infusions:dilTIAZem, 5-15 mg/hr, Last Rate: 5 mg/hr (09/07/23 1422)      PRN Meds:.  acetaminophen **OR** acetaminophen **OR** acetaminophen    senna-docusate sodium **AND** polyethylene glycol **AND** bisacodyl **AND** bisacodyl    nitroglycerin    ondansetron **OR** ondansetron    [COMPLETED] Insert Peripheral IV **AND** sodium chloride    sodium chloride    sodium chloride    Imaging:  Imaging Results (Last 72 Hours)       Procedure Component Value Units Date/Time    CT Head Without Contrast [[id number]] Collected: 09/07/23 1239     Updated: 09/07/23 1243    Narrative:      CT HEAD WO CONTRAST    Date of Exam: 9/7/2023 12:31 PM EDT    Indication: dizziness/ near syncope.    Comparison: None available.    Technique: Axial CT images were obtained of the head without contrast administration.  Coronal reconstructions were performed.  Automated exposure control and iterative reconstruction methods were used.      Findings:  Small left frontal scalp hematoma is present measuring 5 mm thickness by 3 cm in length. No calvarial fracture is identified.    No intracranial hemorrhage, mass lesion, mass effect, midline shift or evidence of acute or evolving infarct. Mild generalized atrophy is present. There is compensatory prominence of the ventricles and extra-axial spaces. Ventricular configuration is   normal. Mild deep white matter hypodensities are nonspecific but favored to represent changes of chronic microvascular disease.    Minor ethmoid and left maxillary sinus mucosal thickening is present. Mastoid air cells are clear. Mild intracranial carotid artery calcifications are present.      Impression:      Impression:    1. Small left frontal scalp hematoma.  2. No acute intracranial findings.  3. Mild atrophy and chronic microvascular disease.      Electronically Signed: Luz Doherty MD    9/7/2023 12:41 PM  "EDT    Workstation ID: [id number]    XR Chest 1 View [[id number]] Collected: 09/07/23 1157     Updated: 09/07/23 1200    Narrative:      XR CHEST 1 VW    Date of Exam: 9/7/2023 11:55 AM EDT    Indication: cough for 1 week    Comparison: None available.    Findings:  Lungs appear hyperinflated. Probable granuloma within the right midlung. No pleural effusion or pneumothorax is identified. The cardiomediastinal silhouette and pulmonary vasculature appear within normal limits. Vascular calcifications are seen. No acute   or suspicious osseous lesion is identified.       Impression:      Impression:  1.No acute radiographic abnormality is identified.  2.Probable pulmonary emphysema, evidence of prior granulomatous disease.      Electronically Signed: Faustino Turner MD    9/7/2023 11:58 AM EDT    Workstation ID: [id number]              GELA Campoverde  09/07/23  16:23 EDT       EMR Dragon/Transcription:   \"Dictated utilizing Dragon dictation\".                 Electronically signed by GELA Campoverde, 09/07/23, 4:23 PM EDT.    "

## 2023-09-07 NOTE — Clinical Note
Level of Care: Med/Surg [1]   Admitting Physician: MARICARMEN VÁSQUEZ [[id number]]   Attending Physician: MARICARMEN VÁSQUEZ [[id number]]

## 2023-09-08 ENCOUNTER — APPOINTMENT (OUTPATIENT)
Dept: CARDIOLOGY | Facility: HOSPITAL | Age: 88
End: 2023-09-08
Payer: MEDICARE

## 2023-09-08 VITALS
HEART RATE: 70 BPM | SYSTOLIC BLOOD PRESSURE: 124 MMHG | BODY MASS INDEX: 24.1 KG/M2 | HEIGHT: 63 IN | OXYGEN SATURATION: 90 % | WEIGHT: 136.02 LBS | DIASTOLIC BLOOD PRESSURE: 56 MMHG | TEMPERATURE: 97.9 F | RESPIRATION RATE: 19 BRPM

## 2023-09-08 LAB
ANION GAP SERPL CALCULATED.3IONS-SCNC: 9 MMOL/L (ref 5–15)
BASOPHILS # BLD AUTO: 0 10*3/MM3 (ref 0–0.2)
BASOPHILS NFR BLD AUTO: 0.3 % (ref 0–1.5)
BUN SERPL-MCNC: 20 MG/DL (ref 8–23)
BUN/CREAT SERPL: 27.4 (ref 7–25)
CALCIUM SPEC-SCNC: 9.8 MG/DL (ref 8.6–10.5)
CHLORIDE SERPL-SCNC: 97 MMOL/L (ref 98–107)
CO2 SERPL-SCNC: 29 MMOL/L (ref 22–29)
CREAT SERPL-MCNC: 0.73 MG/DL (ref 0.57–1)
DEPRECATED RDW RBC AUTO: 45.9 FL (ref 37–54)
EGFRCR SERPLBLD CKD-EPI 2021: 79.2 ML/MIN/1.73
EOSINOPHIL # BLD AUTO: 0 10*3/MM3 (ref 0–0.4)
EOSINOPHIL NFR BLD AUTO: 0.2 % (ref 0.3–6.2)
ERYTHROCYTE [DISTWIDTH] IN BLOOD BY AUTOMATED COUNT: 14.3 % (ref 12.3–15.4)
GLUCOSE SERPL-MCNC: 98 MG/DL (ref 65–99)
HCT VFR BLD AUTO: 31.6 % (ref 34–46.6)
HGB BLD-MCNC: 10.6 G/DL (ref 12–15.9)
LYMPHOCYTES # BLD AUTO: 1.2 10*3/MM3 (ref 0.7–3.1)
LYMPHOCYTES NFR BLD AUTO: 27.1 % (ref 19.6–45.3)
MAGNESIUM SERPL-MCNC: 2 MG/DL (ref 1.6–2.4)
MCH RBC QN AUTO: 29.3 PG (ref 26.6–33)
MCHC RBC AUTO-ENTMCNC: 33.5 G/DL (ref 31.5–35.7)
MCV RBC AUTO: 87.4 FL (ref 79–97)
MONOCYTES # BLD AUTO: 0.7 10*3/MM3 (ref 0.1–0.9)
MONOCYTES NFR BLD AUTO: 16.1 % (ref 5–12)
NEUTROPHILS NFR BLD AUTO: 2.5 10*3/MM3 (ref 1.7–7)
NEUTROPHILS NFR BLD AUTO: 56.3 % (ref 42.7–76)
NRBC BLD AUTO-RTO: 0.1 /100 WBC (ref 0–0.2)
PLATELET # BLD AUTO: 127 10*3/MM3 (ref 140–450)
PMV BLD AUTO: 8.8 FL (ref 6–12)
POTASSIUM SERPL-SCNC: 3.4 MMOL/L (ref 3.5–5.2)
QT INTERVAL: 293 MS
QTC INTERVAL: 421 MS
RBC # BLD AUTO: 3.62 10*6/MM3 (ref 3.77–5.28)
SODIUM SERPL-SCNC: 135 MMOL/L (ref 136–145)
WBC NRBC COR # BLD: 4.4 10*3/MM3 (ref 3.4–10.8)

## 2023-09-08 PROCEDURE — G0378 HOSPITAL OBSERVATION PER HR: HCPCS

## 2023-09-08 PROCEDURE — 97162 PT EVAL MOD COMPLEX 30 MIN: CPT

## 2023-09-08 PROCEDURE — 85025 COMPLETE CBC W/AUTO DIFF WBC: CPT | Performed by: NURSE PRACTITIONER

## 2023-09-08 PROCEDURE — 36415 COLL VENOUS BLD VENIPUNCTURE: CPT | Performed by: NURSE PRACTITIONER

## 2023-09-08 PROCEDURE — 80048 BASIC METABOLIC PNL TOTAL CA: CPT | Performed by: NURSE PRACTITIONER

## 2023-09-08 PROCEDURE — 83735 ASSAY OF MAGNESIUM: CPT | Performed by: NURSE PRACTITIONER

## 2023-09-08 RX ORDER — METOPROLOL SUCCINATE 50 MG/1
50 TABLET, EXTENDED RELEASE ORAL
Qty: 30 TABLET | Refills: 0 | Status: SHIPPED | OUTPATIENT
Start: 2023-09-09

## 2023-09-08 RX ORDER — METOPROLOL SUCCINATE 50 MG/1
100 TABLET, EXTENDED RELEASE ORAL
Status: DISCONTINUED | OUTPATIENT
Start: 2023-09-08 | End: 2023-09-08

## 2023-09-08 RX ORDER — POTASSIUM CHLORIDE 20 MEQ/1
40 TABLET, EXTENDED RELEASE ORAL EVERY 4 HOURS
Status: COMPLETED | OUTPATIENT
Start: 2023-09-08 | End: 2023-09-08

## 2023-09-08 RX ORDER — METOPROLOL SUCCINATE 50 MG/1
50 TABLET, EXTENDED RELEASE ORAL
Status: DISCONTINUED | OUTPATIENT
Start: 2023-09-08 | End: 2023-09-08 | Stop reason: HOSPADM

## 2023-09-08 RX ADMIN — Medication 10 ML: at 09:32

## 2023-09-08 RX ADMIN — POTASSIUM CHLORIDE 40 MEQ: 1500 TABLET, EXTENDED RELEASE ORAL at 09:31

## 2023-09-08 RX ADMIN — SENNOSIDES AND DOCUSATE SODIUM 2 TABLET: 50; 8.6 TABLET ORAL at 09:31

## 2023-09-08 RX ADMIN — FAMOTIDINE 40 MG: 20 TABLET ORAL at 09:31

## 2023-09-08 RX ADMIN — POTASSIUM CHLORIDE 40 MEQ: 1500 TABLET, EXTENDED RELEASE ORAL at 05:34

## 2023-09-08 NOTE — PLAN OF CARE
Goal Outcome Evaluation:              Outcome Evaluation: Pt is an 87 y/o female presenting to [facility] on 9/7 with c/o cough and SOA, and dizziness. Pt diagnosed with Covid. PMH chronic atrial fibrillation, hypertension, hyperlipidemia, recent mechanical fall.  CT head 9/7: Small left frontal scalp hematoma.  No acute intracranial findings. MRI brain 9/7: No definite acute intracranial abnormality.  Chest XR 9/7: probable pulmonary emphysema.  Pt A&O x4; pt with one fall within last week (tripped). Pt reports PLOF of (I) with all household/community mobility/ambulation, living alone in [facility] with one step to enter home. Pt with support from son, daughter-in-law, and other family with daily intermittent sup/assist available. At time of PT eval, pt completes bed mobility and trasnfers with supervision and ambulates x20 ft with no AD and CGA. Due to decline in mobility and impaired endurance, follow-up HHPT recommended at time of d/c from [facility].      Anticipated Discharge Disposition (PT): home with home health, home with assist   Detail Level: Detailed

## 2023-09-08 NOTE — DISCHARGE SUMMARY
[facility]   DISCHARGE SUMMARY    Patient Name: Alie Randhawa  : 1935  MRN: [id number]    Date of Admission: 2023  Date of Discharge:  23    Primary Care Physician: Provider, No Known      Presenting Problem:   Atrial fibrillation [I48.91]  Numbness [R20.0]  Near syncope [R55]  Atrial fibrillation with RVR [I48.91]  COVID-19 virus detected [U07.1]    Active and Resolved Hospital Problems:  Active Hospital Problems    Diagnosis POA    **Atrial fibrillation [I48.91] Yes      Resolved Hospital Problems   No resolved problems to display.         Hospital Course     Hospital Course:  Alie Randhawa is a 88 y.o. female with chronic atrial fibrillation, hypertension, hyperlipidemia, recent mechanical fall who presented to [facility] on 2023 complaining of cough and shortness of breath for 3 to 4 days, and dizziness and lip numbness that started this morning lasting approximately 30 minutes.  Patient was found to be in atrial fibrillation with rates in the 120s, and COVID-positive.  Started on Cardizem drip initially, other home meds continued.  MRI brain negative for new acute findings.  Cardiology consulted.  Patient converted to NSR, turned off Cardizem drip.  Home metoprolol dose decreased to 50 mg daily, home Xarelto continued per cardiology.  Patient remained stable now, no further symptoms noted.  Feels much better and wants to go home today.  Blood pressure remained stable and well-controlled, hold losartan/DC on discharge to avoid hypotension at home due to increased risk of falls and to follow-up with PCP outpatient.  PT/OT recommending home on discharge.  Okay to discharge per cardiology.  Patient is medically stable to discharge home today with follow-up with PCP and cardiology as an outpatient.        DISCHARGE Follow Up Recommendations for labs and diagnostics:   Follow-up with PCP and cardiology    Reasons For Change In Medications and  Indications for New Medications:  Metoprolol decreased per cardiology.  Losartan/HCTZ on hold    Day of Discharge     Vital Signs:  Temp:  [98 °F (36.7 °C)-98.6 °F (37 °C)] 98 °F (36.7 °C)  Heart Rate:  [] 70  Resp:  [15-22] 22  BP: ()/(42-64) 124/56    Physical Exam:  General: Awake, alert, elderly female, NAD  Eyes: PERRL, EOMI, conjunctivae are clear, left eye periorbital ecchymosis noted  Cardiovascular: Regular rate and rhythm, no murmurs  Respiratory: Clear to auscultation bilaterally, no wheezing or rales, unlabored breathing  Abdomen: Soft, nontender, positive bowel sounds, no guarding  Neurologic: A&O, CN grossly intact, moves all extremities spontaneously  Musculoskeletal: Normal range of motion, no other gross deformities  Skin: Warm, dry, intact        Pertinent  and/or Most Recent Results     LAB RESULTS:      Lab 09/08/23  0017 09/07/23  1140   WBC 4.40 6.00   HEMOGLOBIN 10.6* 11.9*   HEMATOCRIT 31.6* 36.5   PLATELETS 127* 124*   NEUTROS ABS 2.50 4.10   LYMPHS ABS 1.20 1.00   MONOS ABS 0.70 0.80   EOS ABS 0.00 0.00   MCV 87.4 87.6         Lab 09/08/23  0017 09/07/23  1140   SODIUM 135* 133*   POTASSIUM 3.4* 4.0   CHLORIDE 97* 96*   CO2 29.0 26.0   ANION GAP 9.0 11.0   BUN 20 19   CREATININE 0.73 0.79   EGFR 79.2 72.1   GLUCOSE 98 99   CALCIUM 9.8 9.9   MAGNESIUM 2.0 2.1   TSH  --  3.870         Lab 09/07/23  1140   TOTAL PROTEIN 6.5   ALBUMIN 3.6   GLOBULIN 2.9   ALT (SGPT) 11   AST (SGOT) 20   BILIRUBIN 0.6   ALK PHOS 68         Lab 09/07/23  1426 09/07/23  1140   HSTROP T 23* 23*                 Brief Urine Lab Results  (Last result in the past 365 days)        Color   Clarity   Blood   Leuk Est   Nitrite   Protein   CREAT   Urine HCG        09/07/23 1407 Yellow   Clear   Small (1+)   Small (1+)   Negative   Negative                 Microbiology Results (last 10 days)       Procedure Component Value - Date/Time    Respiratory Panel PCR w/COVID-19(SARS-CoV-2) DESEAN/ACE/EPHRAIM/PAD/COR/MAD/ROBERT  In-House, NP Swab in [facility], 3-4 HR TAT - Swab, Nasopharynx [[id number]]  (Abnormal) Collected: 09/07/23 1142    Lab Status: Final result Specimen: Swab from Nasopharynx Updated: 09/07/23 1239     ADENOVIRUS, PCR Not Detected     Coronavirus 229E Not Detected     Coronavirus HKU1 Not Detected     Coronavirus NL63 Not Detected     Coronavirus OC43 Not Detected     COVID19 Detected     Human Metapneumovirus Not Detected     Human Rhinovirus/Enterovirus Not Detected     Influenza A PCR Not Detected     Influenza B PCR Not Detected     Parainfluenza Virus 1 Not Detected     Parainfluenza Virus 2 Not Detected     Parainfluenza Virus 3 Not Detected     Parainfluenza Virus 4 Not Detected     RSV, PCR Not Detected     Bordetella pertussis pcr Not Detected     Bordetella parapertussis PCR Not Detected     Chlamydophila pneumoniae PCR Not Detected     Mycoplasma pneumo by PCR Not Detected    Narrative:      In the setting of a positive respiratory panel with a viral infection PLUS a negative procalcitonin without other underlying concern for bacterial infection, consider observing off antibiotics or discontinuation of antibiotics and continue supportive care. If the respiratory panel is positive for atypical bacterial infection (Bordetella pertussis, Chlamydophila pneumoniae, or Mycoplasma pneumoniae), consider antibiotic de-escalation to target atypical bacterial infection.            CT Head Without Contrast    Result Date: 9/7/2023  Impression: Impression: 1. Small left frontal scalp hematoma. 2. No acute intracranial findings. 3. Mild atrophy and chronic microvascular disease. Electronically Signed: Luz Doherty MD  9/7/2023 12:41 PM EDT  Workstation ID: [id number]    MRI Brain Without Contrast    Result Date: 9/7/2023  Impression: Impression: 1. Mild motion limitation 2. No definite acute ischemic change 3. White matter changes most compatible with small vessel ischemic disease in this age group 4. No definite acute  intracranial abnormality. Electronically Signed: Arnoldo Agustin MD  9/7/2023 7:25 PM EDT  Workstation ID: [id number]    XR Chest 1 View    Result Date: 9/7/2023  Impression: Impression: 1.No acute radiographic abnormality is identified. 2.Probable pulmonary emphysema, evidence of prior granulomatous disease. Electronically Signed: Faustino Turner MD  9/7/2023 11:58 AM EDT  Workstation ID: [id number]                 Labs Pending at Discharge:      Procedures Performed           Consults:   Consults       Date and Time Order Name Status Description    9/7/2023  3:53 PM Inpatient Cardiology Consult Completed               Discharge Details        Discharge Medications        Changes to Medications        Instructions Start Date   metoprolol succinate XL 50 MG 24 hr tablet  Commonly known as: TOPROL-XL  What changed:   medication strength  how much to take  when to take this   50 mg, Oral, Every 24 Hours Scheduled   Start Date: September 9, 2023            Continue These Medications        Instructions Start Date   aspirin 81 MG EC tablet   81 mg, Oral, Nightly      cholecalciferol 25 MCG (1000 UT) tablet  Commonly known as: VITAMIN D3   1,000 Units, Oral, Nightly      FLAXSEED OIL PO   1 capsule, Oral, Daily      Magnesium 250 MG tablet   250 mg, Oral      multivitamin with minerals tablet tablet   1 tablet, Oral, Nightly      rivaroxaban 15 MG tablet  Commonly known as: XARELTO   15 mg, Oral, Daily      vitamin B-12 500 MCG tablet  Commonly known as: CYANOCOBALAMIN   1,000 mcg, Oral, Daily      vitamin C 250 MG tablet  Commonly known as: ASCORBIC ACID   500 mg, Oral, Daily      vitamin E 400 UNIT capsule   400 Units, Oral, Daily             Stop These Medications      losartan-hydrochlorothiazide 100-25 MG per tablet  Commonly known as: HYZAAR              Allergies   Allergen Reactions    Shellfish-Derived Products Anaphylaxis    Erythromycin Unknown - High Severity    Keflex [Cephalexin] Unknown - High Severity     Nitrofuran Derivatives Unknown - High Severity    Penicillins Unknown - High Severity    Prednisone Unknown - High Severity         Discharge Disposition:  Home or Self Care    Diet:  Hospital:  Diet Order   Procedures    Diet: Cardiac Diets; Healthy Heart (2-3 Na+); Texture: Regular Texture (IDDSI 7); Fluid Consistency: Thin (IDDSI 0)         Discharge Activity:         CODE STATUS:  Code Status and Medical Interventions:   Ordered at: 09/07/23 1603     Code Status (Patient has no pulse and is not breathing):    CPR (Attempt to Resuscitate)     Medical Interventions (Patient has pulse or is breathing):    Full Support         No future appointments.        Time spent on Discharge including face to face service:  35 minutes    Signature:    Electronically signed by Irma Koroma DO, 09/08/23, 11:51 AM EDT.      Part of this note may be an electronic transcription/translation of spoken language to printed text using the Dragon Dictation System.

## 2023-09-08 NOTE — PLAN OF CARE
Goal Outcome Evaluation:         Patient was admitted to the unit accompanied by family members. Patient is alert and oriented. V/S checked as documented. Enhanced droplet precautions in place. Wall suction was set up for patient due to coughing up of copious secretion. Family member at bedside.

## 2023-09-08 NOTE — THERAPY EVALUATION
Patient Name: Alie Randhawa  : 1935    MRN: [id number]                              Today's Date: 2023       Admit Date: 2023    Visit Dx:     ICD-10-CM ICD-9-CM   1. Atrial fibrillation with RVR  I48.91 427.31   2. Near syncope  R55 780.2   3. COVID-19 virus detected  U07.1 079.89   4. Numbness  R20.0 782.0     Patient Active Problem List   Diagnosis    Atrial fibrillation     Past Medical History:   Diagnosis Date    Atrial fibrillation     Hyperlipidemia     Hypertension      Past Surgical History:   Procedure Laterality Date    BACK SURGERY        General Information       Row Name 23 1108          Physical Therapy Time and Intention    Document Type evaluation  -JV     Mode of Treatment physical therapy  -JV       Row Name 23 1108          General Information    Patient Profile Reviewed yes  -JV     Prior Level of Function independent:;all household mobility;community mobility;driving  -JV     Existing Precautions/Restrictions fall  -JV     Barriers to Rehab medically complex;previous functional deficit  -JV       Row Name 23 1108          Living Environment    People in Home alone  -JV       Row Name 23 1108          Home Main Entrance    Number of Stairs, Main Entrance one  -JV       Row Name 23 1108          Stairs Within Home, Primary    Number of Stairs, Within Home, Primary none  -JV       Row Name 23 1108          Cognition    Orientation Status (Cognition) oriented x 4  -JV       Row Name 23 1108          Safety Issues, Functional Mobility    Impairments Affecting Function (Mobility) endurance/activity tolerance;strength;shortness of breath  -JV               User Key  (r) = Recorded By, (t) = Taken By, (c) = Cosigned By      Initials Name Provider Type    JV Safia Bauer Physical Therapist                   Mobility       Row Name 23 1130          Bed Mobility    Bed Mobility supine-sit-supine  -JV     Supine-Sit-Supine [place]  (Bed Mobility) supervision;verbal cues;nonverbal cues (demo/gesture)  -JV     Assistive Device (Bed Mobility) bed rails;head of bed elevated  -JV       [facility] Name 09/08/23 1130          Bed-Chair Transfer    Bed-Chair [place] (Transfers) supervision;verbal cues;nonverbal cues (demo/gesture)  -JV       Row Name 09/08/23 1130          Sit-Stand Transfer    Sit-Stand [place] (Transfers) supervision;verbal cues;nonverbal cues (demo/gesture)  -JV       [facility] Name 09/08/23 1130          Gait/Stairs (Locomotion)    [place] Level (Gait) nonverbal cues (demo/gesture);verbal cues;contact guard  -JV     Distance in Feet (Gait) 20 ft x2; single hand held assist  -JV     Deviations/Abnormal Patterns (Gait) base of support, narrow;tess decreased;gait speed decreased  -JV               User Key  (r) = Recorded By, (t) = Taken By, (c) = Cosigned By      Initials Name Provider Type    Safia Patel Physical Therapist                   Obj/Interventions       Row Name 09/08/23 1314          Range of Motion Comprehensive    General Range of Motion bilateral lower extremity ROM WFL  [facility] 09/08/23 1314          Strength Comprehensive (MMT)    Comment, General Manual Muscle Testing (MMT) Assessment BLE grossly 4/5  [facility] Name 09/08/23 1314          Sensory Assessment (Somatosensory)    Sensory Assessment (Somatosensory) LE sensation intact  -JV               User Key  (r) = Recorded By, (t) = Taken By, (c) = Cosigned By      Initials Name Provider Type    Safia Patel Physical Therapist                   Goals/Plan       Row Name 09/08/23 1318          Bed Mobility Goal 1 (PT)    Activity/Assistive Device (Bed Mobility Goal 1, PT) bed mobility activities, all  -JV     [place] Level/Cues Needed (Bed Mobility Goal 1, PT) independent  -JV     Time Frame (Bed Mobility Goal 1, PT) long term goal (LTG);2 weeks  [facility] Name 09/08/23 1318          Transfer Goal 1 (PT)     Activity/Assistive Device (Transfer Goal 1, PT) bed-to-chair/chair-to-bed;sit-to-stand/stand-to-sit  -JV     [place] Level/Cues Needed (Transfer Goal 1, PT) independent  -JV     Time Frame (Transfer Goal 1, PT) long term goal (LTG);2 weeks  -JV       Row Name 09/08/23 1318          Gait Training Goal 1 (PT)    Activity/Assistive Device (Gait Training Goal 1, PT) gait (walking locomotion)  -JV     [place] Level (Gait Training Goal 1, PT) independent  -JV     Distance (Gait Training Goal 1, PT) 150 ft with SaO2 >90% with room air  -JV     Time Frame (Gait Training Goal 1, PT) long term goal (LTG);2 weeks  -JV       Row Name 09/08/23 1318          Therapy Assessment/Plan (PT)    Planned Therapy Interventions (PT) balance training;bed mobility training;gait training;home exercise program;patient/family education;strengthening;transfer training  -JV               User Key  (r) = Recorded By, (t) = Taken By, (c) = Cosigned By      Initials Name Provider Type    Safia Patel Physical Therapist                   Clinical Impression       Row Name 09/08/23 1113          Pain    Pretreatment Pain Rating 0/10 - no pain  -JV     Posttreatment Pain Rating 0/10 - no pain  -JV       Row Name 09/08/23 1113          Plan of Care Review    Outcome Evaluation Pt is an 89 y/o female presenting to [facility] on 9/7 with c/o cough and SOA, and dizziness. Pt diagnosed with Covid. PMH chronic atrial fibrillation, hypertension, hyperlipidemia, recent mechanical fall.  CT head 9/7: Small left frontal scalp hematoma.  No acute intracranial findings. MRI brain 9/7: No definite acute intracranial abnormality.  Chest XR 9/7: probable pulmonary emphysema.  Pt A&O x4; pt with one fall within last week (tripped). Pt reports PLOF of (I) with all household/community mobility/ambulation, living alone in [facility] with one step to enter home. Pt with support from son, daughter-in-law, and other family with daily intermittent sup/assist available.  At time of PT eval, pt completes bed mobility and trasnfers with supervision and ambulates x20 ft with no AD and CGA. Due to decline in mobility and impaired endurance, follow-up HHPT recommended at time of d/c from [facility].  -JV       Row Name 09/08/23 1113          Therapy Assessment/Plan (PT)    Criteria for Skilled Interventions Met (PT) yes;meets criteria;skilled treatment is necessary  -JV     Therapy Frequency (PT) 3 times/wk  -JV     Predicted Duration of Therapy Intervention (PT) until d/c  -JV       Row Name 09/08/23 1113          Vital Signs    Pre Systolic BP Rehab 125  -JV     Pre Treatment Diastolic BP 45  -JV     Intra Systolic BP Rehab 126  -JV     Intra Treatment Diastolic BP 57  -JV     Post Systolic BP Rehab 131  -JV     Post Treatment Diastolic BP 69  -JV     Pretreatment Heart Rate (beats/min) 73  -JV     Intratreatment Heart Rate (beats/min) 73  -JV     Pre SpO2 (%) 95  -JV     O2 Delivery Pre Treatment room air  -JV     Intra SpO2 (%) 96  -JV     O2 Delivery Intra Treatment room air  -JV     Post SpO2 (%) 100  -JV     O2 Delivery Post Treatment room air  -JV     Pre Patient Position Supine  -JV     Intra Patient Position Sitting  -JV     Post Patient Position Standing  -JV       Row Name 09/08/23 1113          Positioning and Restraints    Pre-Treatment Position in bed  -JV     Post Treatment Position chair  -JV     In Chair sitting;call light within reach;encouraged to call for assist;exit alarm on;with family/caregiver;notified nsg  -JV               User Key  (r) = Recorded By, (t) = Taken By, (c) = Cosigned By      Initials Name Provider Type    Safia Patel Physical Therapist                   Outcome Measures       Row Name 09/08/23 0800          How much help from another person do you currently need...    Turning from your back to your side while in flat bed without using bedrails? 4  -JV     Moving from lying on back to sitting on the side of a flat bed without bedrails? 4  -JV      Moving to and from a bed to a chair (including a wheelchair)? 4  -JV     Standing up from a chair using your arms (e.g., wheelchair, bedside chair)? 3  -JV     Climbing 3-5 steps with a railing? 2  -JV     To walk in hospital room? 3  -JV     AM-PAC 6 Clicks Score (PT) 20  -JV     Highest level of mobility 6 --> Walked 10 steps or more  -JV               User Key  (r) = Recorded By, (t) = Taken By, (c) = Cosigned By      Initials Name Provider Type    J Mary Beth Vick LPN Licensed Nurse                                 Physical Therapy Education       Title: PT OT SLP Therapies (Done)       Topic: Physical Therapy (Done)       Point: Mobility training (Done)       Learning Progress Summary             Patient Acceptance, E,TB, VU by  at 9/8/2023 1319                                         User Key       Initials Effective Dates Name Provider Type Discipline     03/30/21 -  Safia Bauer Physical Therapist PT                  PT Recommendation and Plan  Planned Therapy Interventions (PT): balance training, bed mobility training, gait training, home exercise program, patient/family education, strengthening, transfer training  Outcome Evaluation: Pt is an 87 y/o female presenting to [facility] on 9/7 with c/o cough and SOA, and dizziness. Pt diagnosed with Covid. PMH chronic atrial fibrillation, hypertension, hyperlipidemia, recent mechanical fall.  CT head 9/7: Small left frontal scalp hematoma.  No acute intracranial findings. MRI brain 9/7: No definite acute intracranial abnormality.  Chest XR 9/7: probable pulmonary emphysema.  Pt A&O x4; pt with one fall within last week (tripped). Pt reports PLOF of (I) with all household/community mobility/ambulation, living alone in [facility] with one step to enter home. Pt with support from son, daughter-in-law, and other family with daily intermittent sup/assist available. At time of PT eval, pt completes bed mobility and trasnfers with supervision and ambulates x20 ft  with no AD and CGA. Due to decline in mobility and impaired endurance, follow-up HHPT recommended at time of d/c from [facility].     Time Calculation:         PT Charges       Row Name 09/08/23 1320             Time Calculation    Start Time 1055  -JV      Stop Time 1136  -JV      Time Calculation (min) 41 min  -JV      PT Received On 09/08/23  -JV      PT - Next Appointment 09/11/23  -JV      PT Goal Re-Cert Due Date 09/22/23  -JV         Time Calculation- PT    Total Timed Code Minutes- PT 0 minute(s)  -JV                User Key  (r) = Recorded By, (t) = Taken By, (c) = Cosigned By      Initials Name Provider Type    Safia Patel Physical Therapist                  Therapy Charges for Today       Code Description Service Date Service Provider Modifiers Qty    98640487246 HC PT EVAL MOD COMPLEXITY 4 9/8/2023 Safia Bauer GP 1            PT G-Codes  AM-PAC 6 Clicks Score (PT): 20  PT Discharge Summary  Anticipated Discharge Disposition (PT): home with home health, home with assist    Safia Bauer  9/8/2023

## 2023-09-08 NOTE — CONSULTS
Consult visit.  Pt in room with daughter in law at bedside. Pt said her  was in yesterday and that today, she wants to go home.  prayed healing prayer for her; daughter in law had no needs at this time. Both were thankful for the visit.

## 2023-09-08 NOTE — DISCHARGE PLACEMENT REQUEST
"Dar Randhawa (88 y.o. Female)       Date of Birth   1935    Social Security Number       Address   [address] [place] [address] [place] [address]    Home Phone   [phone]    MRN   [id number]       Judaism   Restorationist    Marital Status                               Admission Date   9/7/23    Admission Type   Emergency    Admitting Provider       Attending Provider   Irma Koroma DO    Department, Room/Bed   [facility] 2D, 252/1       Discharge Date       Discharge Disposition   Home or Self Care    Discharge Destination                                 Attending Provider: Irma Koroma DO    Allergies: Shellfish-derived Products, Erythromycin, Keflex [Cephalexin], Nitrofuran Derivatives, Penicillins, Prednisone    Isolation: Enh Drop/Con   Infection: COVID (confirmed) (09/07/23)   Code Status: CPR    Ht: 160 cm (63\")   Wt: 61.7 kg (136 lb 0.4 oz)    Admission Cmt: None   Principal Problem: Atrial fibrillation [I48.91]                   Active Insurance as of 9/7/2023       Primary Coverage       Payor Plan Insurance Group Employer/Plan Group    MEDICARE MEDICARE A & B        Payor Plan Address Payor Plan Phone Number Payor Plan Fax Number Effective Dates    [address] [phone]  1/1/2000 - None Entered    [facility] [id number]         Subscriber Name Subscriber Birth Date Member ID       DAR RANDHAWA 1935 [id number]               Secondary Coverage       Payor Plan Insurance Group Employer/Plan Group     FOR LIFE  FOR LIFE MC SUP  [id number]       Payor Plan Address Payor Plan Phone Number Payor Plan Fax Number Effective Dates    [address] [phone]  9/7/2023 - None Entered    [facility] [address]         Subscriber Name Subscriber Birth Date Member ID       AI RANDHAWA 11/30/1938 [id number]                     Emergency Contacts        (Rel.) Home Phone Work Phone Mobile Phone    SydneeOtis (Friend) [phone] -- --                "

## 2023-09-08 NOTE — CASE MANAGEMENT/SOCIAL WORK
Case Management Discharge Note      Final Note: Home with [facility]              Home Medical Care Coordination complete.      Service Provider Selected Services Address Phone Fax Patient Preferred    [facility] [address] [facility] [place] [address] [phone] -- --                 Transportation Services  Private: Car    Final Discharge Disposition Code: 06 - home with home health care

## 2023-09-08 NOTE — PROGRESS NOTES
[facility] Medicine Services   Daily Progress Note      Patient Name: Alie Randhawa  : 1935  MRN: [id number]  Primary Care Physician:  Provider, No Known  Date of admission: 2023      Subjective      Chief Complaint: Fall    Patient seen and examined this morning.  Taking over care today, ate breakfast, denies any particular complaints.  Has left eye bruise, family at bedside.  All questions answered per family.  MRI brain negative for acute CVA.  Cardiology to follow-up today.    Pertinent positives as noted in HPI/subjective.  All other systems were reviewed and are negative.      Objective      Vitals:   Temp:  [97.8 °F (36.6 °C)-98.6 °F (37 °C)] 98 °F (36.7 °C)  Heart Rate:  [] 71  Resp:  [15-22] 22  BP: ()/(42-71) 97/50    Physical Exam:    General: Awake, alert, elderly female, NAD  Eyes: PERRL, EOMI, conjunctivae are clear, left eye periorbital ecchymosis noted  Cardiovascular: Regular rate and rhythm, no murmurs  Respiratory: Clear to auscultation bilaterally, no wheezing or rales, unlabored breathing  Abdomen: Soft, nontender, positive bowel sounds, no guarding  Neurologic: A&O, CN grossly intact, moves all extremities spontaneously  Musculoskeletal: Normal range of motion, no other gross deformities  Skin: Warm, dry, intact         Result Review    Result Review:  I have personally reviewed the results from the time of this admission to 2023 10:11 EDT and agree with these findings:  [x]  Laboratory  [x]  Microbiology  [x]  Radiology  [x]  EKG/Telemetry   [x]  Cardiology/Vascular   []  Pathology  [x]  Old records  []  Other:          Assessment & Plan      Brief Patient Summary:  Alie Randhawa is a 88 y.o. female with chronic atrial fibrillation, hypertension, hyperlipidemia, recent mechanical fall who presented to [facility] on 2023 complaining of cough and shortness of breath for 3 to 4 days, and dizziness and lip numbness that started this morning  lasting approximately 30 minutes.  Patient was found to be in atrial fibrillation with rates in the 120s, and COVID-positive.  Started on Cardizem drip initially, other home meds continued.  MRI brain negative for new acute findings.  Cardiology consulted.      aspirin, 81 mg, Oral, Nightly  famotidine, 40 mg, Oral, Daily  metoprolol succinate XL, 50 mg, Oral, Q24H  rivaroxaban, 15 mg, Oral, Daily  senna-docusate sodium, 2 tablet, Oral, BID  sodium chloride, 10 mL, Intravenous, Q12H       dilTIAZem, 5-15 mg/hr, Last Rate: Stopped (09/07/23 1742)         I have utilized all available, immediate resources to obtain, update, or review the patient's current medications including all prescriptions, over-the-counter products, herbals, cannabis/cannabidiol products, and vitamin.mineral/dietary (nutritional) supplements.    Active Hospital Problems:  Active Hospital Problems    Diagnosis     **Atrial fibrillation      Plan:     #Near syncope, lip numbness, improved  #Previous mechanical fall resulting in left eye bruising  -CT head and MRI brain negative for any acute findings  -Orthostatics stable  -Echo pending  -PT/OT eval     #A-fib RVR  #Chronic anticoagulation with Xarelto  -Follows with Dr. Garcia on outpatient basis  -TSH normal  -Weaned off Cardizem drip  -Continue home meds including Xarelto  -Cardiology following     #COVID-positive  -Remains on room air, stable  -Continue supportive care     #Hypertension  -Hold losartan/HCTZ  -Blood pressure stable, monitor     #Hyperlipidemia  -No statin on home medication list     DVT prophylaxis  -Xarelto    CODE STATUS:    Code Status (Patient has no pulse and is not breathing): CPR (Attempt to Resuscitate)  Medical Interventions (Patient has pulse or is breathing): Full Support      Disposition: Pending improvement.    Electronically signed by Irma Koroma DO, 09/08/23, 10:11 EDT.  [facility] Hospitalist Team      Part of this note may be an electronic  transcription/translation of spoken language to printed text using the Dragon Dictation System.